# Patient Record
(demographics unavailable — no encounter records)

---

## 2024-11-27 NOTE — ADDENDUM
[FreeTextEntry1] : This note was written by Nallely Renner, acting as the  for Dr. Vallejo on 11/27/2024. This note accurately reflects the work and decisions made by Dr. Vallejo.

## 2024-11-27 NOTE — DISCUSSION/SUMMARY
[Medication Risks Reviewed] : Medication risks reviewed [Surgical risks reviewed] : Surgical risks reviewed [de-identified] : Patient is a 67-year-old female here today for evaluation of left sided low back pain SI joint pain is going on for the past few months.  I reassured her that based on her x-rays as well as her examination her hip does not appear to be involved.  There is no evidence of arthritic changes on her x-ray.  I have therefore recommended conservative treatment at this time.  We discussed low-impact activity exercise.  I recommended physical therapy.  Have given a prescription for diclofenac 50 mg twice daily.  I have given her referral over the physiatry service for further evaluation management possible consideration of SI joint injections.  I will see her back on an as-needed basis.  All questions were asked and answered

## 2024-11-27 NOTE — HISTORY OF PRESENT ILLNESS
[de-identified] : This is a 67 year old F pt presents today with left hip pain. The pain has been there for a few months without injury. Pt is ambulating without use of any assistance device. She presents today with chief complaint of intermittent, moderate dull aching pain around the backside of the hip. She stopped her yoga activities however the pain was still there after months. She reports pain while sleeping on her side and walking. Causing her to curve over while walking.  No radicular pain or paresthesia. Pt denies any sharp pain, numbness, and tingling. No constitutional symptoms noted.    Pt will take Advil for pain management which helps.

## 2024-11-27 NOTE — PHYSICAL EXAM
[de-identified] : GENERAL APPEARANCE: Well nourished and hydrated, pleasant, alert, and oriented x 3. Appears their stated age. HEENT: Normocephalic, extraocular eye motion intact. Nasal septum midline. Oral cavity clear. External auditory canal clear. RESPIRATORY: Breath sounds clear and audible in all lobes. No wheezing, No accessory muscle use. CARDIOVASCULAR: No apparent abnormalities. No lower leg edema. No varicosities. Pedal pulses are palpable. NEUROLOGIC: Sensation is normal, no muscle weakness in the upper or lower extremities. DERMATOLOGIC: No apparent skin lesions, moist, warm, no rash. SPINE: Cervical spine appears normal and moves freely; thoracic spine appears normal and moves freely; lumbosacral spine appears normal and moves freely, normal, nontender. Tenderness over the left paraspinal muscular MUSCULOSKELETAL: Hands, wrists, and elbows are normal and move freely, shoulders are normal and move freely. PSYCHIATRIC: Oriented to person, place, and time, insight and judgement were intact and the affect was normal. DTRs: Biceps, brachioradialis, triceps, patellar, ankle and plantar 2+ and symmetric bilaterally. Pulses: dorsalis pedis, posterior tibial, femoral, popliteal, and radial 2+ and symmetric bilaterally. Constitutional: Alert and in no acute distress, but well-appearing.  left para spinal  tenderness left full 70 30 neg ir er no  [de-identified] : left hip exam showed ROM is full flexion with 70 degrees of external rotation and 30 degrees of internal rotation without pain, DONTRELL negative, FADIR negative. No palpation over the greater trochanter.  5/5 motor strength in bilateral lower extremities. Sensory: Intact in bilateral lower extremities.    [de-identified] : AP pelvis and 2 views of the left hip obtained the office today show no acute fracture or dislocation.  No significant degenerative changes noted

## 2024-12-09 NOTE — DATA REVIEWED
[Other: ___] : [unfilled] [FreeTextEntry1] : DEXA (HIPS,LUMBAR) WITH LVA  HISTORY: Ex smoker, Osteopenia, Post-Menopausal Z78.0 Postmenopausal   The examination was performed on the [employer] Dexa system.  Study includes AP spine analysis L1 Through L4 as well as dual proximal femora.  Bone Density Measurements  Lumbar Spine:  L1-L4: Bmd (Grams/Centimeters Squared) = 1.061 g/cm2 T-Score: -1.1 Z-Score: 0.5  LEFT HIP  Femoral Neck: Bmd (Grams/Centimeters Squared) = 0.838 g/cm2 T-Score = -1.4 Z-Score = 0.2  Total Hip: Bmd (Grams/Centimeters Squared) = 0.800 g/cm2 T-Score = -1.6 Z-Score = -0.3  RIGHT HIP  Femoral Neck: Bmd (Grams/Centimeters Squared) = 0.825 g/cm2 T-Score = -1.5 Z-Score = 0.1  Total Hip: Bmd (Grams/Centimeters Squared) = 0.811 g/cm2 T-Score = -1.6 Z-Score = -0.2    FRAX 10 YEAR FRACTURE RISK Major Osteoporotic Fracture: 9.2 % Hip Fracture: 1.1 %  Lateral vertebral assessment (LVA) was performed from the upper thoracic level through the L4 level is normal  Spine Curvature:  There is mild S-shaped scoliosis   COMPARISON:  L1-L4 mean bone mineral density has increased 1.3% since August 2022  Bilateral femoral neck mean bone mineral density has decreased 8.5% since August 2022 IMPRESSION:   Osteopenia  FOR MEDICAL PRACTITIONERS - The complete color report, images, graphs, tables and the detailed level-by-level analysis are available and can be printed or uploaded into your EMR via the physician web portal.  Signed by: Steven Mendelsohn MD Signed Date: 9/4/2024 11:37 AM EDT    SIGNED BY: Steven Mendelsohn, M.D., Ext. [phone] 09/04/2024 11:37 AM

## 2024-12-09 NOTE — HISTORY OF PRESENT ILLNESS
[FreeTextEntry1] : Ms. STACEY TURPIN is a 67 year female who presents with left hip/low back pain   PMH of osteopenia.    HPI  12/09/2024 Location: low back/left buttock  Onset: a few months ago, after hot yoga.  Provocation/Palliative: worse with going up and down stairs, laying down at night.  Quality: gripping, achy Radiation: nonradiating  Severity:  8/10   Denies any associated numbness. Denies any associated leg weakness. Denies any loss of bowel/bladder control or any groin numbness.   Current pain medications: diclofenac 50mg BID PRN (by Ortho)   Previous medications trialed: ibuprofen PRN   Previous procedures relevant to complaint: Injections: none for spine or joints  Surgery: none for spine or joints    Conservative therapy tried: Physical Therapy: HEP: hot yoga      Diagnostic studies reviewed by me: DEXA: mild scoliosis, osteopenia

## 2024-12-09 NOTE — PHYSICAL EXAM
[FreeTextEntry1] : Constitutional: In NAD, calm and cooperative Heart: well perfused Lungs: nonlabored breathing MSK (Back) Inspection: no gross swelling identified, no scars Palpation: Tenderness of the left SI joint  ROM: Pain at end lumbar extension and rotation  Strength: 5/5 strength in bilateral lower extremities Reflexes: 2+ Patella reflex bilaterally, 2+ Achilles reflex bilaterally, negative clonus bilaterally Sensation: Intact to light touch in bilateral lower extremities Special tests: SLR: neg BL DONTRELL: positive L  FADIR: neg BL Pelvic Compression: positive L  Thigh Thrust:positive L  Betito's Finger:positive L  Facet loading: neg BL

## 2024-12-16 NOTE — HISTORY OF PRESENT ILLNESS
[FreeTextEntry1] : CPE [de-identified] : 66 y/o female presents for annual wellness exam. No particular questions or concerns today.  Insomnia. Uses Xanax PRN.

## 2024-12-16 NOTE — PLAN
[FreeTextEntry1] : 67-year-old female for annual physical exam. Doing well. Labs ordered today. Patient is up to date with bone density, Pap, Mammo and colonoscopy.   Insomnia. Stable. Cont Xanax prn.   All questions answered. Patient voiced understanding and in agreement above plan. Return to clinic as recommended in 1 year for annual PE unless otherwise necessary.

## 2024-12-16 NOTE — HEALTH RISK ASSESSMENT
[No] : No [0] : 2) Feeling down, depressed, or hopeless: Not at all (0) [Former] : Former [Patient reported mammogram was normal] : Patient reported mammogram was normal [Patient reported PAP Smear was normal] : Patient reported PAP Smear was normal [MammogramDate] : 03/2024 [PapSmearDate] : 03/2024

## 2024-12-16 NOTE — REVIEW OF SYSTEMS
[Fever] : no fever [Fatigue] : no fatigue [Discharge] : no discharge [Chest Pain] : no chest pain [Shortness Of Breath] : no shortness of breath [Cough] : no cough [Abdominal Pain] : no abdominal pain [Dysuria] : no dysuria [Headache] : no headache

## 2025-01-10 NOTE — REASON FOR VISIT
[Follow-Up] : a follow-up visit [Home] : at home, [unfilled] , at the time of the visit. [Medical Office: ([facility])___] : at the medical office located in  [Patient] : the patient [Self] : self [This encounter was initiated by telehealth (audio with video) and converted to telephone (audio only) due to technical difficulties.] : This encounter was initiated by telehealth (audio with video) and converted to telephone (audio only) due to technical difficulties.

## 2025-01-10 NOTE — HISTORY OF PRESENT ILLNESS
[FreeTextEntry1] : Ms. STACEY TURPIN is a 67 year female who presents with left hip/low back pain   PMH of osteopenia.    Interval 01/10/2025 - telehealth  Ms. STACEY TURPIN is a 67 year female presents for follow up for left hip/low back pain. At last visit, PT and PO NSAIDs were started. Patient states that she has been compliant with physical therapy and her home exercises. She has resumed hot yoga, and completed PO NSAIDs. She continues to have low back and left buttock pain that is non-radiating. Overall pain is slightly better, during the day, her pain is 4/10, but at night time, her pain increases to an 8/10. Denies any associated numbness. Denies any associated leg weakness. Denies any loss of bowel/bladder control or any groin numbness.   HPI  12/09/2024 Location: low back/left buttock  Onset: a few months ago, after hot yoga.  Provocation/Palliative: worse with going up and down stairs, laying down at night.  Quality: gripping, achy Radiation: nonradiating  Severity:  8/10   Denies any associated numbness. Denies any associated leg weakness. Denies any loss of bowel/bladder control or any groin numbness.   Current pain medications: diclofenac 50mg BID PRN (by Ortho)   Previous medications trialed: ibuprofen PRN   Previous procedures relevant to complaint: Injections: none for spine or joints  Surgery: none for spine or joints    Conservative therapy tried: Physical Therapy: completed  HEP: hot yoga      Diagnostic studies reviewed by me: DEXA: mild scoliosis, osteopenia

## 2025-01-10 NOTE — DATA REVIEWED
[Other: ___] : [unfilled] [FreeTextEntry1] : DEXA (HIPS,LUMBAR) WITH LVA  HISTORY: Ex smoker, Osteopenia, Post-Menopausal Z78.0 Postmenopausal   The examination was performed on the [employer] Dexa system.  Study includes AP spine analysis L1 Through L4 as well as dual proximal femora.  Bone Density Measurements  Lumbar Spine:  L1-L4: Bmd (Grams/Centimeters Squared) = 1.061 g/cm2 T-Score: -1.1 Z-Score: 0.5  LEFT HIP  Femoral Neck: Bmd (Grams/Centimeters Squared) = 0.838 g/cm2 T-Score = -1.4 Z-Score = 0.2  Total Hip: Bmd (Grams/Centimeters Squared) = 0.800 g/cm2 T-Score = -1.6 Z-Score = -0.3  RIGHT HIP  Femoral Neck: Bmd (Grams/Centimeters Squared) = 0.825 g/cm2 T-Score = -1.5 Z-Score = 0.1  Total Hip: Bmd (Grams/Centimeters Squared) = 0.811 g/cm2 T-Score = -1.6 Z-Score = -0.2    FRAX 10 YEAR FRACTURE RISK Major Osteoporotic Fracture: 9.2 % Hip Fracture: 1.1 %  Lateral vertebral assessment (LVA) was performed from the upper thoracic level through the L4 level is normal  Spine Curvature:  There is mild S-shaped scoliosis   COMPARISON:  L1-L4 mean bone mineral density has increased 1.3% since August 2022  Bilateral femoral neck mean bone mineral density has decreased 8.5% since August 2022 IMPRESSION:   Osteopenia  FOR MEDICAL PRACTITIONERS - The complete color report, images, graphs, tables and the detailed level-by-level analysis are available and can be printed or uploaded into your EMR via the physician web portal.  Signed by: Steven Mendelsohn MD Signed Date: 9/4/2024 11:37 AM EDT    SIGNED BY: Steven Mendelsohn, M.D., Ext. [phone] 09/04/2024 11:37 AM

## 2025-01-10 NOTE — DATA REVIEWED
[Other: ___] : [unfilled] [FreeTextEntry1] : DEXA (HIPS,LUMBAR) WITH LVA  HISTORY: Ex smoker, Osteopenia, Post-Menopausal Z78.0 Postmenopausal   The examination was performed on the [employer] Dexa system.  Study includes AP spine analysis L1 Through L4 as well as dual proximal femora.  Bone Density Measurements  Lumbar Spine:  L1-L4: Bmd (Grams/Centimeters Squared) = 1.061 g/cm2 T-Score: -1.1 Z-Score: 0.5  LEFT HIP  Femoral Neck: Bmd (Grams/Centimeters Squared) = 0.838 g/cm2 T-Score = -1.4 Z-Score = 0.2  Total Hip: Bmd (Grams/Centimeters Squared) = 0.800 g/cm2 T-Score = -1.6 Z-Score = -0.3  RIGHT HIP  Femoral Neck: Bmd (Grams/Centimeters Squared) = 0.825 g/cm2 T-Score = -1.5 Z-Score = 0.1  Total Hip: Bmd (Grams/Centimeters Squared) = 0.811 g/cm2 T-Score = -1.6 Z-Score = -0.2    FRAX 10 YEAR FRACTURE RISK Major Osteoporotic Fracture: 9.2 % Hip Fracture: 1.1 %  Lateral vertebral assessment (LVA) was performed from the upper thoracic level through the L4 level is normal  Spine Curvature:  There is mild S-shaped scoliosis   COMPARISON:  L1-L4 mean bone mineral density has increased 1.3% since August 2022  Bilateral femoral neck mean bone mineral density has decreased 8.5% since August 2022 IMPRESSION:   Osteopenia  FOR MEDICAL PRACTITIONERS - The complete color report, images, graphs, tables and the detailed level-by-level analysis are available and can be printed or uploaded into your EMR via the physician web portal.  Signed by: Steven Mendelsohn MD Signed Date: 9/4/2024 11:37 AM EDT    SIGNED BY: Steven Mendelsohn, M.D., Ext. [phone] 09/04/2024 11:37 AM

## 2025-01-10 NOTE — ASSESSMENT
[FreeTextEntry1] : Ms. STACEY TURPIN is a 67 year female who presents with chronic left low back/buttock pain.  No focal deficits on exam.  Discussed with the patient that the etiology of her pain is most likely due to left sacroiliac joint pain, with multiple positive SI joint provocative maneuvers on exam. Since last visit, patient notes ~50% improvement in her daytime L SIJ pain, thought nighttime pain persists. She would like to continue HEP and trial topical NSAIDs prior to consideration of injections.     Impression: low back pain  sacroiliac joint pain   Plan:  - ortho notes reviewed  -DEXA scan reviewed - continue HEP -  Start diclofenac gel to affected area 3-4x/day PRN, rx sent. Advised not to take concurrently with PO NSAIDS - discussed R/B/A of sacroiliac joint CSI.  -If pain does not improve, patient will return for consideration of injections - RTC in 6-8 weeks or PRN  The patient expressed verbal understanding and is in agreement with the plan of care. All of the patient's questions and concerns were addressed during today's visit.      [Joint Pain] : joint pain [Joint Stiffness] : joint stiffness [Joint Swelling] : joint swelling [Headache] : headache [Negative] : Heme/Lymph

## 2025-01-10 NOTE — REASON FOR VISIT
[Follow-Up] : a follow-up visit [Home] : at home, [unfilled] , at the time of the visit. [Medical Office: ([facility])___] : at the medical office located in  [Patient] : the patient [Self] : self [This encounter was initiated by telehealth (audio with video) and converted to telephone (audio only) due to technical difficulties.] : This encounter was initiated by telehealth (audio with video) and converted to telephone (audio only) due to technical difficulties.

## 2025-01-10 NOTE — ASSESSMENT
[FreeTextEntry1] : Ms. STACEY TURPIN is a 67 year female who presents with chronic left low back/buttock pain.  No focal deficits on exam.  Discussed with the patient that the etiology of her pain is most likely due to left sacroiliac joint pain, with multiple positive SI joint provocative maneuvers on exam. Since last visit, patient notes ~50% improvement in her daytime L SIJ pain, thought nighttime pain persists. She would like to continue HEP and trial topical NSAIDs prior to consideration of injections.     Impression: low back pain  sacroiliac joint pain   Plan:  - ortho notes reviewed  -DEXA scan reviewed - continue HEP -  Start diclofenac gel to affected area 3-4x/day PRN, rx sent. Advised not to take concurrently with PO NSAIDS - discussed R/B/A of sacroiliac joint CSI.  -If pain does not improve, patient will return for consideration of injections - RTC in 6-8 weeks or PRN  The patient expressed verbal understanding and is in agreement with the plan of care. All of the patient's questions and concerns were addressed during today's visit.

## 2025-05-19 NOTE — PLAN
[FreeTextEntry1] : 68-year-old female for chronic rash of unknown etiology.  Labs as ordered.  If JACINTO positive, rheum referral discussed.  All questions answered.  Patient voiced understanding and in agreement to plan.  Return to clinic as recommended

## 2025-05-19 NOTE — HISTORY OF PRESENT ILLNESS
[FreeTextEntry8] : 69 yo F for chronic rash on face since about 2020. Intermittent. Was treated for rosacea in the past w/o relief. Saw derm and allergist. States that this started happening after shingles vaccine.

## 2025-06-09 NOTE — HISTORY OF PRESENT ILLNESS
[FreeTextEntry1] : Ms. STACEY TURPIN is a 68 year female who presents with left hip/low back pain   PMH of osteopenia.   Interval 06/09/2025 Ms. STACEY TURPIN is a 68 year female presents for follow up for left buttock and low back pain. Since last visit, she has been compliant with yoga and PO NSAIDs. She states that gradually, her pain has gotten worse over the past few months. Pain is now constant and uncomfortable, rated 5-7/10, worse with standing, walking and at night. Pain does not radiate.  Denies any associated numbness. Denies any associated leg weakness. Denies any loss of bowel/bladder control or any groin numbness.   Interval 01/10/2025 - telehealth  Ms. STACEY TURPIN is a 67 year female presents for follow up for left hip/low back pain. At last visit, PT and PO NSAIDs were started. Patient states that she has been compliant with physical therapy and her home exercises. She has resumed hot yoga, and completed PO NSAIDs. She continues to have low back and left buttock pain that is non-radiating. Overall pain is slightly better, during the day, her pain is 4/10, but at night time, her pain increases to an 8/10. Denies any associated numbness. Denies any associated leg weakness. Denies any loss of bowel/bladder control or any groin numbness.   HPI  12/09/2024 Location: low back/left buttock  Onset: a few months ago, after hot yoga.  Provocation/Palliative: worse with going up and down stairs, laying down at night.  Quality: gripping, achy Radiation: nonradiating  Severity:  8/10   Denies any associated numbness. Denies any associated leg weakness. Denies any loss of bowel/bladder control or any groin numbness.   Current pain medications: diclofenac 50mg BID PRN (by Ortho)   Previous medications trialed: ibuprofen PRN   Previous procedures relevant to complaint: Injections: none for spine or joints  Surgery: none for spine or joints    Conservative therapy tried: Physical Therapy: completed  HEP: hot yoga      Diagnostic studies reviewed by me: DEXA: mild scoliosis, osteopenia   XR hip: levoscoliosis of lower lumbar spine, SI joints intact

## 2025-06-09 NOTE — DATA REVIEWED
[Other: ___] : [unfilled] [FreeTextEntry1] : DEXA (HIPS,LUMBAR) WITH LVA  HISTORY: Ex smoker, Osteopenia, Post-Menopausal Z78.0 Postmenopausal   The examination was performed on the [employer] Dexa system.  Study includes AP spine analysis L1 Through L4 as well as dual proximal femora.  Bone Density Measurements  Lumbar Spine:  L1-L4: Bmd (Grams/Centimeters Squared) = 1.061 g/cm2 T-Score: -1.1 Z-Score: 0.5  LEFT HIP  Femoral Neck: Bmd (Grams/Centimeters Squared) = 0.838 g/cm2 T-Score = -1.4 Z-Score = 0.2  Total Hip: Bmd (Grams/Centimeters Squared) = 0.800 g/cm2 T-Score = -1.6 Z-Score = -0.3  RIGHT HIP  Femoral Neck: Bmd (Grams/Centimeters Squared) = 0.825 g/cm2 T-Score = -1.5 Z-Score = 0.1  Total Hip: Bmd (Grams/Centimeters Squared) = 0.811 g/cm2 T-Score = -1.6 Z-Score = -0.2    FRAX 10 YEAR FRACTURE RISK Major Osteoporotic Fracture: 9.2 % Hip Fracture: 1.1 %  Lateral vertebral assessment (LVA) was performed from the upper thoracic level through the L4 level is normal  Spine Curvature:  There is mild S-shaped scoliosis   COMPARISON:  L1-L4 mean bone mineral density has increased 1.3% since August 2022  Bilateral femoral neck mean bone mineral density has decreased 8.5% since August 2022 IMPRESSION:   Osteopenia  FOR MEDICAL PRACTITIONERS - The complete color report, images, graphs, tables and the detailed level-by-level analysis are available and can be printed or uploaded into your EMR via the physician web portal.  Signed by: Steven Mendelsohn MD Signed Date: 9/4/2024 11:37 AM EDT    SIGNED BY: Steven Mendelsohn, M.D., Ext. [phone] 09/04/2024 11:37 AM

## 2025-06-09 NOTE — PHYSICAL EXAM
[FreeTextEntry1] : Constitutional: In NAD, calm and cooperative Heart: well perfused Lungs: nonlabored breathing MSK (Back) Inspection: no gross swelling identified, no scars Palpation: Tenderness of the left SI joint ROM: Pain at end lumbar extension and rotation Strength: 5/5 strength in bilateral lower extremities Reflexes: 2+ Patella reflex bilaterally, 2+ Achilles reflex bilaterally, negative clonus bilaterally Sensation: Intact to light touch in bilateral lower extremities Special tests: SLR: neg BL DONTRELL: positive L FADIR: neg BL Pelvic Compression: positive L Thigh Thrust:positive L Betito's Finger:positive L Facet loading: neg BL.

## 2025-06-09 NOTE — ASSESSMENT
[FreeTextEntry1] : Ms. STACEY TURPIN is a 67 year female who presents with chronic left low back/buttock pain.  No focal deficits on exam.  Discussed with the patient that the etiology of her pain is most likely due to left sacroiliac joint pain, with multiple positive SI joint provocative maneuvers on exam. Since last visit, patient notes progressive worsening of pain despite adherence to yoga, home exercise and PO NSAIDs. Returns for consideration of injections     Impression: low back pain  sacroiliac joint pain   Plan:  - left SI joint injection x1 ordered, no clearances needed.  - XR L hip reviewed - continue HEP - continue  diclofenac gel to affected area 3-4x/day PRN, rx sent. Advised not to take concurrently with PO NSAIDS - RTC in after injection  The patient expressed verbal understanding and is in agreement with the plan of care. All of the patient's questions and concerns were addressed during today's visit.

## 2025-06-09 NOTE — REVIEW OF SYSTEMS
Called and spoke with pt today about scheduling appt with Dr. Espinal but, pt states he was in Dr. Parker office for and appt. Now.  He had gone to the ED for withdraws problem from percocet. pt spoke with Dr. Espinal and she recommended him to call [facility] while he was here. Pt states at this time finances will not allow him. Patient states he will call both places for help and when he's admitted he will call letting Dr. Espinal know the plan that the program give him.                 [Fever] : no fever [Chills] : no chills [Chest Pain] : no chest pain [Shortness Of Breath] : no shortness of breath [FreeTextEntry9] : +left low back/buttock pain

## 2025-06-19 NOTE — PROCEDURE
[de-identified] : Sacroiliac Joint Injection   Attending: Amara Cordon DO   Pre-operative diagnosis: Sacroiliac Joint dysfunction   Post-operative diagnosis: Sacroiliac Joint dysfunction   Procedure: sacroiliac joint injection under fluoroscopic guidance   SEDATION: As per Anesthesia   ESTIMATED BLOOD LOSS: None   SIDE: left     DESCRIPTION: Patient was greeted in the pre-procedure holding area. The risks, benefits and alternatives to the procedure were again reviewed with the patient and written informed consent was placed in the chart. Patient was taken to the procedure room and positioned prone on the fluoroscopic table. Prior to the procedure, a time out was completed, verifying correct patient, procedure and site.   The skin was prepped with chlorhexidine and draped in usual sterile fashion. A  film was taken to identify LEFT sacroiliac joint, and the inferior most intersection of the anterior and posterior sacroiliac joint lines. The overlying skin and subcutaneous tissue were anesthetized using a 25-gauge and 1-1/2 inch needle with 1% preservative-free lidocaine for a total volume of 3ml. A 22-gauge 3.5" Quincke spinal needle was advanced into the sacroiliac joint space under intermittent fluoroscopic guidance. Needle position was confirmed on both AP and lateral views   Then, after negative aspiration, 1mL of contrast was injected, which confirmed adequate intra-articular spread. There was no evidence of intravascular uptake. At this point, after negative aspiration, a total of 40mg depomedrol and 1mL of lidocaine 1% was injected easily into the joint. The needle was then flushed with 1% lidocaine and removed. The needle insertion site was dressed appropriately.      Disposition: Patient was taken to the recovery room where they were monitored for a brief period of time. The patient tolerated the procedure well and was discharged home in stable condition with post procedural instructions. Patient was instructed to follow up in 2 weeks.